# Patient Record
Sex: FEMALE | Race: WHITE | NOT HISPANIC OR LATINO | URBAN - METROPOLITAN AREA
[De-identification: names, ages, dates, MRNs, and addresses within clinical notes are randomized per-mention and may not be internally consistent; named-entity substitution may affect disease eponyms.]

---

## 2021-11-03 DIAGNOSIS — M79.642 LEFT HAND PAIN: Primary | ICD-10-CM

## 2024-09-23 DIAGNOSIS — D75.89 MACROCYTOSIS: Primary | ICD-10-CM

## 2024-09-30 ENCOUNTER — TELEPHONE (OUTPATIENT)
Facility: CLINIC | Age: 69
End: 2024-09-30
Payer: MEDICARE

## 2024-09-30 NOTE — NURSING
Oncology Navigation   Intake  Cancer Type: Benign hem  Type of Referral: External  Date of Referral: 09/23/24  Initial Nurse Navigator Contact: 09/30/24  Referral to Initial Contact Timeline (days): 7     Treatment                              Acuity      Follow Up  No follow-ups on file.

## 2024-10-10 ENCOUNTER — TELEPHONE (OUTPATIENT)
Facility: CLINIC | Age: 69
End: 2024-10-10
Payer: MEDICARE

## 2024-10-11 ENCOUNTER — OFFICE VISIT (OUTPATIENT)
Facility: CLINIC | Age: 69
End: 2024-10-11
Payer: MEDICARE

## 2024-10-11 ENCOUNTER — TELEPHONE (OUTPATIENT)
Facility: CLINIC | Age: 69
End: 2024-10-11
Payer: MEDICARE

## 2024-10-11 VITALS
DIASTOLIC BLOOD PRESSURE: 68 MMHG | SYSTOLIC BLOOD PRESSURE: 128 MMHG | TEMPERATURE: 97 F | WEIGHT: 94.63 LBS | HEART RATE: 82 BPM

## 2024-10-11 DIAGNOSIS — D75.89 MACROCYTOSIS: Primary | ICD-10-CM

## 2024-10-11 DIAGNOSIS — E07.9 THYROID DISORDER: ICD-10-CM

## 2024-10-11 DIAGNOSIS — D53.9 NUTRITIONAL ANEMIA: ICD-10-CM

## 2024-10-11 DIAGNOSIS — E53.1 VITAMIN B6 DEFICIENCY: Primary | ICD-10-CM

## 2024-10-11 PROCEDURE — 99213 OFFICE O/P EST LOW 20 MIN: CPT | Mod: PBBFAC,PN | Performed by: INTERNAL MEDICINE

## 2024-10-11 PROCEDURE — 99999 PR PBB SHADOW E&M-EST. PATIENT-LVL III: CPT | Mod: PBBFAC,,, | Performed by: INTERNAL MEDICINE

## 2024-10-11 PROCEDURE — 99205 OFFICE O/P NEW HI 60 MIN: CPT | Mod: S$PBB,,, | Performed by: INTERNAL MEDICINE

## 2024-10-11 PROCEDURE — G2211 COMPLEX E/M VISIT ADD ON: HCPCS | Mod: S$PBB,,, | Performed by: INTERNAL MEDICINE

## 2024-10-11 RX ORDER — BUPROPION HYDROCHLORIDE 150 MG/1
1 TABLET, EXTENDED RELEASE ORAL DAILY
COMMUNITY
Start: 2024-05-20

## 2024-10-11 RX ORDER — SERTRALINE HYDROCHLORIDE 100 MG/1
100 TABLET, FILM COATED ORAL DAILY
COMMUNITY
Start: 2024-05-20

## 2024-10-11 NOTE — TELEPHONE ENCOUNTER
Michaelle, I have MD appt 8;15 am as it turns out.  Try to catch Mrs. Andrews, come in 9:30 see me.    Try to reach Mr. Jacobsen, come in 10:15 am see me.

## 2024-10-11 NOTE — LETTER
October 12, 2024        Hernando Joshua MD  [address] [facility]  [address]  [place] [address]             [facility]  [address]    [place] [address]  Phone: [phone]  Fax: [phone]   Patient: Julianne Andrews   MR Number: [id number]   YOB: 1955   Date of Visit: 10/11/2024       Dear Dr. Joshua:    Thank you for referring Julianne Andrews to me for evaluation. Below are the relevant portions of my assessment and plan of care.    n:     Macrocytosis  -     Ambulatory referral/consult to Hematology / Oncology  -     TSH; Future; Expected date: 10/11/2024  -     CBC Auto Differential; Future; Expected date: 10/11/2024  -     Vitamin B12; Future; Expected date: 10/11/2024  -     Folate; Future; Expected date: 10/11/2024  -     Ferritin; Future; Expected date: 10/11/2024  -     Reticulocytes; Future; Expected date: 10/11/2024  -     VITAMIN B6; Future; Expected date: 10/11/2024    Thyroid disorder  -     TSH; Future; Expected date: 10/11/2024    Nutritional anemia  -     CBC Auto Differential; Future; Expected date: 10/11/2024  -     Vitamin B12; Future; Expected date: 10/11/2024  -     Folate; Future; Expected date: 10/11/2024  -     Ferritin; Future; Expected date: 10/11/2024  -     Reticulocytes; Future; Expected date: 10/11/2024  -     VITAMIN B6; Future; Expected date: 10/11/2024            If you have questions, please do not hesitate to call me. I look forward to following Julianne along with you.    Sincerely,      JAYNE Soto MD           CC    No Recipients
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
## 2024-11-05 ENCOUNTER — OFFICE VISIT (OUTPATIENT)
Facility: CLINIC | Age: 69
End: 2024-11-05
Payer: MEDICARE

## 2024-11-05 VITALS
WEIGHT: 92.5 LBS | HEART RATE: 62 BPM | BODY MASS INDEX: 15.79 KG/M2 | SYSTOLIC BLOOD PRESSURE: 117 MMHG | DIASTOLIC BLOOD PRESSURE: 68 MMHG | TEMPERATURE: 98 F | HEIGHT: 64 IN | RESPIRATION RATE: 16 BRPM

## 2024-11-05 DIAGNOSIS — D75.89 MACROCYTOSIS: Primary | ICD-10-CM

## 2024-11-05 PROCEDURE — G2211 COMPLEX E/M VISIT ADD ON: HCPCS | Mod: S$PBB,,, | Performed by: INTERNAL MEDICINE

## 2024-11-05 PROCEDURE — 99215 OFFICE O/P EST HI 40 MIN: CPT | Mod: S$PBB,,, | Performed by: INTERNAL MEDICINE

## 2024-11-05 PROCEDURE — 99999 PR PBB SHADOW E&M-EST. PATIENT-LVL III: CPT | Mod: PBBFAC,,, | Performed by: INTERNAL MEDICINE

## 2024-11-05 PROCEDURE — 99213 OFFICE O/P EST LOW 20 MIN: CPT | Mod: PBBFAC,PN | Performed by: INTERNAL MEDICINE

## 2024-11-05 NOTE — LETTER
November 20, 2024        Hernando Joshua MD  [address]  [address]  [place] [address]             [facility]  [address]    [place] [address]  Phone: [phone]  Fax: [phone]   Patient: Julianne Andrews   MR Number: [id number]   YOB: 1955   Date of Visit: 11/5/2024       Dear Dr. Joshua:    Thank you for referring Julianne Andrews to me for evaluation. Below are the relevant portions of my assessment and plan of care.    n:     Macrocytosis  -     CBC w/ DIFF; Future; Expected date: 01/07/2025              If you have questions, please do not hesitate to call me. I look forward to following Julianne along with you.    Sincerely,      JAYNE Soto MD           CC  No Recipients
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
## 2025-01-08 ENCOUNTER — TELEPHONE (OUTPATIENT)
Facility: CLINIC | Age: 70
End: 2025-01-08
Payer: MEDICARE

## 2025-01-08 DIAGNOSIS — D75.89 MACROCYTOSIS: Primary | ICD-10-CM

## 2025-01-08 DIAGNOSIS — D53.9 NUTRITIONAL ANEMIA: ICD-10-CM

## 2025-03-18 ENCOUNTER — TELEPHONE (OUTPATIENT)
Facility: CLINIC | Age: 70
End: 2025-03-18
Payer: MEDICARE

## 2025-03-18 NOTE — TELEPHONE ENCOUNTER
Called pt to confirm appt & remind pt to complete labs prior to appt.. Pt canceled due to family member needing surgery. Will call back to reschedule. EM